# Patient Record
Sex: FEMALE | Race: WHITE
[De-identification: names, ages, dates, MRNs, and addresses within clinical notes are randomized per-mention and may not be internally consistent; named-entity substitution may affect disease eponyms.]

---

## 2017-11-10 ENCOUNTER — HOSPITAL ENCOUNTER (OUTPATIENT)
Dept: HOSPITAL 53 - M WHC | Age: 52
End: 2017-11-10
Attending: NURSE PRACTITIONER
Payer: COMMERCIAL

## 2017-11-10 DIAGNOSIS — Z12.31: Primary | ICD-10-CM

## 2017-11-10 DIAGNOSIS — Z78.0: ICD-10-CM

## 2017-11-10 DIAGNOSIS — E65: ICD-10-CM

## 2017-11-10 DIAGNOSIS — Z92.0: ICD-10-CM

## 2017-11-10 DIAGNOSIS — Z79.891: ICD-10-CM

## 2017-11-10 NOTE — REPMRS
Patient History

The patient states she had a clinical breast exam in 11/2017.

Patient is postmenopausal.

Family history of endometrial cancer in mother at age 75.

Took hormonal contraceptives for 12 years.  Took unspecified 

hormones for 10 years.

 

Digital Woman Screen Mammo: November 10, 2017 - Exam #: 

[id number]

Bilateral CC and MLO view(s) were taken.

 

Technologist: Rocío Coates Technologist

Prior study comparison: September 20, 2016, digital woman screen 

mammo performed at [facility].  October 11, 2013, 

bilateral bilat screen digital mammo, performed at [facility] ([facility]).  October 12, 2011, bilateral bilat screen 

digital mammo, performed at [facility] ([facility]).

 

FINDINGS:  The breast tissue is almost entirely fat.  There has 

been no change in the appearance of the mammogram from the prior 

studies.  There is no interval development of dominant mass, 

architectural distortion, or clustered microcalcification typical

of malignancy.

 

ASSESSMENT: BI-RADS/ACR category 1 mammogram. Negative.

 

Recommendation

Routine screening mammogram of both breasts in 1 year (for women 

over age 40).

 

This mammogram was interpreted with the aid of an FDA-approved 

computer-aided dectection system.

 

Electronically Signed By: Piotr Sands MD 11/10/17 [id number]

## 2019-03-08 ENCOUNTER — HOSPITAL ENCOUNTER (OUTPATIENT)
Dept: HOSPITAL 53 - M WHC | Age: 54
End: 2019-03-08
Attending: NURSE PRACTITIONER
Payer: COMMERCIAL

## 2019-03-08 DIAGNOSIS — Z80.49: ICD-10-CM

## 2019-03-08 DIAGNOSIS — Z12.31: Primary | ICD-10-CM

## 2019-03-08 DIAGNOSIS — Z92.29: ICD-10-CM

## 2019-03-08 DIAGNOSIS — Z92.0: ICD-10-CM

## 2019-03-08 NOTE — REPMRS
Patient History

The patient states she had a clinical breast exam in 03/2019.

Family history of endometrial cancer at age 75 in mother.

Took hormonal contraceptives for 12 years.  Took unspecified 

hormones for 10 years.

 

3D TOMOSYNTHESIS WAS PERFORMED.

 

Digital Woman Screen Mammo: March 8, 2019 - Exam #: 

[id number]

Bilateral CC and MLO view(s) were taken.

 

Technologist: Tea Shane, Technologist

Prior study comparison: November 10, 2017, digital woman screen 

mammo performed at [facility].  September 20, 2016,

digital woman screen mammo performed at [facility].

 

FINDINGS: There are scattered fibroglandular densities.  There 

has been no change in the appearance of the mammogram from the 

prior studies.  There is a mild amount of residual fibroglandular

tissue which is fairly symmetric. There is no interval 

development of dominant mass, architectural distortion, or 

clustered microcalcification suggestive of malignancy.

 

Assessment: BI-RADS/ACR category 1 mammogram. Negative Mammogram.

 

Recommendation

Routine screening mammogram in 1 year (for women over age 40).

This mammogram was interpreted with the aid of an FDA-approved 

computer-aided dectection system.

 

Electronically Signed By: Margarito May MD 03/08/19 [id number]